# Patient Record
Sex: FEMALE | Race: WHITE
[De-identification: names, ages, dates, MRNs, and addresses within clinical notes are randomized per-mention and may not be internally consistent; named-entity substitution may affect disease eponyms.]

---

## 2017-02-02 ENCOUNTER — HOSPITAL ENCOUNTER (OUTPATIENT)
Dept: HOSPITAL 25 - ED | Age: 67
Setting detail: OBSERVATION
LOS: 1 days | Discharge: HOME | End: 2017-02-03
Attending: HOSPITALIST | Admitting: HOSPITALIST
Payer: MEDICARE

## 2017-02-02 DIAGNOSIS — Z88.1: ICD-10-CM

## 2017-02-02 DIAGNOSIS — F41.9: ICD-10-CM

## 2017-02-02 DIAGNOSIS — E03.9: ICD-10-CM

## 2017-02-02 DIAGNOSIS — G40.909: ICD-10-CM

## 2017-02-02 DIAGNOSIS — Z23: ICD-10-CM

## 2017-02-02 DIAGNOSIS — G40.509: Primary | ICD-10-CM

## 2017-02-02 LAB
ALBUMIN SERPL BCG-MCNC: 4 G/DL (ref 3.2–5.2)
ALP SERPL-CCNC: 156 U/L (ref 34–104)
ALT SERPL W P-5'-P-CCNC: 13 U/L (ref 7–52)
AMMONIA PLAS-SCNC: 30 MOL/L (ref 16–53)
ANION GAP SERPL CALC-SCNC: 9 MMOL/L (ref 2–11)
AST SERPL-CCNC: 15 U/L (ref 13–39)
BNP SERPL-MCNC: 20 PG/ML
BUN SERPL-MCNC: 10 MG/DL (ref 6–24)
BUN/CREAT SERPL: 15.4 (ref 8–20)
CALCIUM SERPL-MCNC: 9 MG/DL (ref 8.6–10.3)
CHLORIDE SERPL-SCNC: 93 MMOL/L (ref 101–111)
CK SERPL-CCNC: 53 U/L (ref 10–223)
GLOBULIN SER CALC-MCNC: 2.7 G/DL (ref 2–4)
GLUCOSE SERPL-MCNC: 117 MG/DL (ref 70–100)
HCO3 SERPL-SCNC: 25 MMOL/L (ref 22–32)
HCT VFR BLD AUTO: 44 % (ref 35–47)
HGB BLD-MCNC: 15.3 G/DL (ref 12–16)
LIPASE SERPL-CCNC: 13 U/L (ref 11–82)
MAGNESIUM SERPL-MCNC: 2 MG/DL (ref 1.9–2.7)
MCH RBC QN AUTO: 30 PG (ref 27–31)
MCHC RBC AUTO-ENTMCNC: 35 G/DL (ref 31–36)
MCV RBC AUTO: 87 FL (ref 80–97)
POTASSIUM SERPL-SCNC: 3.6 MMOL/L (ref 3.5–5)
PROT SERPL-MCNC: 6.7 G/DL (ref 6.4–8.9)
RBC # BLD AUTO: 5.09 10^6/UL (ref 4–5.4)
SODIUM SERPL-SCNC: 127 MMOL/L (ref 133–145)
TROPONIN I SERPL-MCNC: 0.02 NG/ML (ref ?–0.04)
TSH SERPL-ACNC: 1.04 MCIU/ML (ref 0.34–5.6)
WBC # BLD AUTO: 7.6 10^3/UL (ref 3.5–10.8)

## 2017-02-02 PROCEDURE — 82550 ASSAY OF CK (CPK): CPT

## 2017-02-02 PROCEDURE — 93005 ELECTROCARDIOGRAM TRACING: CPT

## 2017-02-02 PROCEDURE — 85025 COMPLETE CBC W/AUTO DIFF WBC: CPT

## 2017-02-02 PROCEDURE — 83880 ASSAY OF NATRIURETIC PEPTIDE: CPT

## 2017-02-02 PROCEDURE — G0378 HOSPITAL OBSERVATION PER HR: HCPCS

## 2017-02-02 PROCEDURE — 81003 URINALYSIS AUTO W/O SCOPE: CPT

## 2017-02-02 PROCEDURE — 80156 ASSAY CARBAMAZEPINE TOTAL: CPT

## 2017-02-02 PROCEDURE — 85730 THROMBOPLASTIN TIME PARTIAL: CPT

## 2017-02-02 PROCEDURE — 80048 BASIC METABOLIC PNL TOTAL CA: CPT

## 2017-02-02 PROCEDURE — 83690 ASSAY OF LIPASE: CPT

## 2017-02-02 PROCEDURE — 82140 ASSAY OF AMMONIA: CPT

## 2017-02-02 PROCEDURE — 84484 ASSAY OF TROPONIN QUANT: CPT

## 2017-02-02 PROCEDURE — 86140 C-REACTIVE PROTEIN: CPT

## 2017-02-02 PROCEDURE — 85610 PROTHROMBIN TIME: CPT

## 2017-02-02 PROCEDURE — 99406 BEHAV CHNG SMOKING 3-10 MIN: CPT

## 2017-02-02 PROCEDURE — 96374 THER/PROPH/DIAG INJ IV PUSH: CPT

## 2017-02-02 PROCEDURE — 84443 ASSAY THYROID STIM HORMONE: CPT

## 2017-02-02 PROCEDURE — 96375 TX/PRO/DX INJ NEW DRUG ADDON: CPT

## 2017-02-02 PROCEDURE — 80053 COMPREHEN METABOLIC PANEL: CPT

## 2017-02-02 PROCEDURE — 81015 MICROSCOPIC EXAM OF URINE: CPT

## 2017-02-02 PROCEDURE — 99283 EMERGENCY DEPT VISIT LOW MDM: CPT

## 2017-02-02 PROCEDURE — 82553 CREATINE MB FRACTION: CPT

## 2017-02-02 PROCEDURE — 83735 ASSAY OF MAGNESIUM: CPT

## 2017-02-02 PROCEDURE — 80299 QUANTITATIVE ASSAY DRUG: CPT

## 2017-02-02 PROCEDURE — 83605 ASSAY OF LACTIC ACID: CPT

## 2017-02-02 PROCEDURE — 36415 COLL VENOUS BLD VENIPUNCTURE: CPT

## 2017-02-02 PROCEDURE — 80177 DRUG SCRN QUAN LEVETIRACETAM: CPT

## 2017-02-02 RX ADMIN — CARBAMAZEPINE SCH MG: 200 TABLET, EXTENDED RELEASE ORAL at 21:55

## 2017-02-02 RX ADMIN — SODIUM CHLORIDE SCH MLS/HR: 900 IRRIGANT IRRIGATION at 08:57

## 2017-02-02 RX ADMIN — LACOSAMIDE SCH MG: 100 TABLET, FILM COATED ORAL at 21:55

## 2017-02-02 RX ADMIN — SODIUM CHLORIDE SCH MLS/HR: 900 IRRIGANT IRRIGATION at 17:49

## 2017-02-02 NOTE — HP
HISTORY AND PHYSICAL:

 

DATE OF ADMISSION:  02/02/17

 

PRIMARY CARE PROVIDER:  Scottie Morales MD

 

CONSULTING NEUROLOGISTS:

1.  Eyad Rodrigez MD

2.  Moni Bruner MD

 

CHIEF COMPLAINT:  Recurrent seizures.

 

HISTORY OF PRESENT ILLNESS:  Ms. Vega is a pleasant 66-year-old woman who came 
to the [facility] ED with several seizures lasting 20 to 30 seconds.  The patient 
continued to have a seizure in the ambulance and then another seizure in the 
emergency room. The patient has a history of seizures for many years.  She is 
under the care of Dr. Moni Bruner.  I believe there was a medication change 
with specific decreased in Vimpat to 150 mg by mouth once daily.  The patient 
is also on Tegretol and Keppra. I believe her Keppra dose is 2500 mg daily with 
2 doses daily; one of 1000 mg and the other one of 1500 mg.  Her Tegretol is 
200 mg by mouth 3 times daily.  Dr. Anthony Vallejo, the ED provider, spoke 
with Dr. Rodrigez over the telephone, who increased her Vimpat.  The patient did 
receive Ativan in the emergency room.  The patient was recommended for discharge
, but there was concern by the patient's brother, who was concerned that her 
independent living situation at [place] was insufficient with respect to 
monitoring.  The hospital service was contacted for observation and we agreed 
to observe her overnight with seizure precautions to ensure that she is stable 
for being discharged to an independent setting.

 

PAST MEDICAL HISTORY:

1.  Anxiety.

2.  Hypothyroidism.

 

OUTPATIENT MEDICATIONS:

1.  Multivitamin 1 tablet by mouth twice daily.

2.  Acetaminophen 650 mg by mouth every 4 hours as needed for pain/fevers.

3.  Tessalon Perles 100 mg by mouth 3 times daily as needed.

4.  Vimpat (lacosamide) 150 mg by mouth daily (A RECENT DECREASE)

5.  Levothyroxine 75 mcg daily.

6.  Loperamide 2 tabs twice daily as needed for loose stools.

7.  Zoloft (sertraline) 50 mg by mouth daily.

8.  Tegretol  mg by mouth 3 times daily.

9.  Keppra (levetiracetam) 1000 mg by mouth in the morning and 1500 mg by mouth 
in the evening.

 

ALLERGIES:  AMOXICILLIN, AMPICILLIN, and PHENACEMIDE.

 

FAMILY HISTORY:  Reviewed, but noncontributory based on the current 
presentation.

 

REVIEW OF SYSTEMS:  Fourteen-system review of systems was accomplished at the 
bedside without pertinent positives other than what is mentioned in the HPI, 
all other systems were negative.

 

                               PHYSICAL EXAMINATION

 

GENERAL APPEARANCE:  An elderly woman, appears stated age, in no apparent 
distress. She is slightly lethargic relative to her baseline.  This is as per 
the , although she has answered questions appropriately.

 

VITAL SIGNS:  On admission, temperature 99.2 Fahrenheit, pulse 97, respirations 
rate 16, oxygen saturation 100% on room air, and blood pressure 129/65.

 

HEENT:  Oropharynx is clear.  Mucous membranes are moist.

 

NECK:  Supple.  No bruits appreciated.  Midline trachea.  Normal thyroid.

 

CHEST:  Clear breath sounds anteriorly and posteriorly.

 

HEART:  Regular rate and rhythm.  No murmurs, rubs, or gallops.

 

ABDOMEN:  Soft and nontender.

 

NEURO:  No focal weakness or sensory loss.

 

PSYCH:  Normal affect.  No acute anxiety or depression noted in my conversion.

 

SKIN:  Dry and intact.

 

LYMPHS:  No adenopathy appreciated.

 

 ADMISSION DATA:  CBC was normal.  INR was unremarkable.  Blood chemistry is 
significant for hyponatremia with a sodium of 127, potassium 3.6, chloride 93, 
bicarb 25, BUN 10, creatinine 0.6, glucose 117, and lactic acid 3.7 (post 
seizure). Calcium and magnesium are normal as are the LFTs except for modestly 
elevated alk phos of 156, ammonia of 30, and total CK 53.  Troponin 0.02.  CRP 
elevated at 54. Urinalysis abnormal, but does not suggest the UTI with urine 
proteins 1+ by dip, urine rbc's 1+ by microscopy, and hyaline casts present as 
well as urine ascorbic acid present.  Toxicology reveals a carbamazepine level 
of 4.5.

 

IMPRESSION:  Ms. Vega is a 66-year-old woman who has longstanding seizures, 
had her seizure medication adjusted with the occurrence of more frequent 
seizures than is customary for her.  Her seizure medication regimen was 
adjusted by consultant, Dr. Eyad Rodrigez, but the patient is in a tenuous 
living situation and it would be proper to observe the patient for resolution 
of her seizures with this adjusted regimen before discharge.  Accordingly, the 
patient is being placed on observation status.  She will be kept on seizure 
precautions and we will continue all her medications as detailed above and she 
will be reevaluated in the morning with the intention of discharge at this 
point.

 

TIME SPENT:  Total time taken to admit Mr. Vega was 65 minutes, greater than 
half the time was spent going over the admission history and physical with the 
patient and discussing the plan of care with the patient's brother.

 

CC:  Dr. Morales; Dr. Rodrigez; Dr. Bruner * 

 

[id number] #: [id number]

MTDD

## 2017-02-02 NOTE — ED
Du RUSH Adam, scribed for Anthony Vallejo MD on 02/02/17 at 0847 .





Neurological HPI





- HPI Summary


HPI Summary: 


Patient is a 65 y/o female [facility] 20-

30seconds per EMS. Pt was having a sz in the exam room (lasting approximately 

15sec) at 0842. She has a Hx of seizures and is on Vimpat (per EMR). PMHx also 

includes anxiety and hypothyroidism. 








- History of Current Complaint


Chief Complaint: EDSeizure


Stated Complaint: SEIZURES


Time Seen by Provider: 02/02/17 08:34


Hx Obtained From: EMS


Hx From Patient Unobtainable Due To: Other - Seizure


Onset/Duration: Sudden Onset, Started hours ago, Still Present


Timing: Intermittent Episodes Lasting: - 15-30 seconds


Onset Severity: Moderate


Current Severity: Moderate


Seizure Severity: Moderate


Number of Seizures: 2


Neurological Deficit Location: Facial


Seizure Character: Partial (Specify) - Facial twitching


Aggravating: Unknown


Alleviating: Spontanious Resolution





- Allergy/Home Medications


Allergies/Adverse Reactions: 


 Allergies











Allergy/AdvReac Type Severity Reaction Status Date / Time


 


Amoxicillin Allergy Severe Swelling Verified 05/08/13 09:04





   Of  





   Face,Lips,&  





   Throat  


 


Ampicillin Allergy Severe Swelling Verified 05/08/13 09:04





   Of  





   Face,Lips,&  





   Throat  


 


Phenacemide [From Phenurone] Allergy Unknown Unknown Verified 05/08/13 09:05





   Reaction  





   Details  














PMH/Surg Hx/FS Hx/Imm Hx


Endocrine/Hematology History: Reports: Hx Thyroid Disease


   Denies: Hx Diabetes


Cardiovascular History: 


   Denies: Hx Hypertension


Respiratory History: 


   Denies: Hx Asthma, Hx Chronic Obstructive Pulmonary Disease (COPD)


GI History: 


   Denies: Hx Ulcer


Musculoskeletal History: 


   Denies: Hx Osteoporosis





- Surgical History


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


Infectious Disease History: 


   Denies: Hx Hepatitis, Hx Human Immunodeficiency Virus (HIV)





- Family History


Known Family History: Positive: Other - Breast CA





- Social History


Occupation: Retired


[facility]


Hx Substance Use: No


Substance Use Type: Reports: None





Review of Systems


Constitutional: Negative


Negative: Fever


Neurological: Other - Seizures


All Other Systems Reviewed And Are Negative: Yes





Physical Exam


Triage Information Reviewed: Yes


Vital Signs On Initial Exam: 


 Initial Vitals











Resp


 


 16 


 


 02/02/17 08:51











Vital Signs Reviewed: Yes


Appearance: Positive: Well-Appearing, No Pain Distress


Skin: Positive: Warm, Skin Color Reflects Adequate Perfusion, Dry


Head/Face: Positive: Normal Head/Face Inspection


Eyes: Positive: EOMI, MINDA


ENT: Positive: Normal ENT inspection


Neck: Positive: Supple, Nontender


Respiratory/Lung Sounds: Positive: Clear to Auscultation, Breath Sounds Present


Cardiovascular: Positive: RRR


Abdomen Description: Positive: Nontender, Soft


Bowel Sounds: Positive: Present


Musculoskeletal: Positive: Normal, Strength/ROM Intact - Moves all extremities


Neurological: Positive: Other - Seizure lasting 15 seconds: Facial twitching, 

eye and head deviation to the right. Post-ictal, confused.


Psychiatric: Positive: Affect/Mood Appropriate





Diagnostics





- Vital Signs


 Vital Signs











  Temp Pulse Resp BP Pulse Ox


 


 02/02/17 09:00  99.2 F  97  16  129/65  100


 


 02/02/17 08:51    16  














- Laboratory


Lab Results: 


 Lab Results











  02/02/17 02/02/17 02/02/17 Range/Units





  09:09 09:09 09:09 


 


WBC  7.6    (3.5-10.8)  10^3/ul


 


RBC  5.09    (4.0-5.4)  10^6/ul


 


Hgb  15.3    (12.0-16.0)  g/dl


 


Hct  44    (35-47)  %


 


MCV  87    (80-97)  fL


 


MCH  30    (27-31)  pg


 


MCHC  35    (31-36)  g/dl


 


RDW  14    (10.5-15)  %


 


Plt Count  213    (150-450)  10^3/ul


 


MPV  8    (7.4-10.4)  um3


 


Neut % (Auto)  91.4 H    (38-83)  %


 


Lymph % (Auto)  3.3 L    (25-47)  %


 


Mono % (Auto)  5.0    (1-9)  %


 


Eos % (Auto)  0.1    (0-6)  %


 


Baso % (Auto)  0.2    (0-2)  %


 


Absolute Neuts (auto)  7.0    (1.5-7.7)  10^3/ul


 


Absolute Lymphs (auto)  0.2 L    (1.0-4.8)  10^3/ul


 


Absolute Monos (auto)  0.4    (0-0.8)  10^3/ul


 


Absolute Eos (auto)  0    (0-0.6)  10^3/ul


 


Absolute Basos (auto)  0    (0-0.2)  10^3/ul


 


Absolute Nucleated RBC  0    10^3/ul


 


Nucleated RBC %  0    


 


INR (Anticoag Therapy)   0.93   (0.89-1.11)  


 


APTT   30.9   (26.0-36.3)  seconds


 


Sodium    127 L  (133-145)  mmol/L


 


Potassium    3.6  (3.5-5.0)  mmol/L


 


Chloride    93 L  (101-111)  mmol/L


 


Carbon Dioxide    25  (22-32)  mmol/L


 


Anion Gap    9  (2-11)  mmol/L


 


BUN    10  (6-24)  mg/dL


 


Creatinine    0.65  (0.51-0.95)  mg/dL


 


Est GFR ( Amer)    118.0  (>60)  


 


Est GFR (Non-Af Amer)    91.8  (>60)  


 


BUN/Creatinine Ratio    15.4  (8-20)  


 


Glucose    117 H  ()  mg/dL


 


Lactic Acid     (0.5-2.0)  mmol/L


 


Calcium    9.0  (8.6-10.3)  mg/dL


 


Magnesium    2.0  (1.9-2.7)  mg/dL


 


Total Bilirubin    0.40  (0.2-1.0)  mg/dL


 


AST    15  (13-39)  U/L


 


ALT    13  (7-52)  U/L


 


Alkaline Phosphatase    156 H  ()  U/L


 


Ammonia     (16-53)  mol/L


 


Total Creatine Kinase    53  ()  U/L


 


CK-MB (CK-2)    1.4  (0.6-6.3)  ng/mL


 


Troponin I    0.02  (<0.04)  ng/mL


 


C-Reactive Protein    54.08 H  (< 5.00)  mg/L


 


B-Natriuretic Peptide    ( - 100) pg/mL


 


Total Protein    6.7  (6.4-8.9)  g/dL


 


Albumin    4.0  (3.2-5.2)  g/dL


 


Globulin    2.7  (2-4)  g/dL


 


Albumin/Globulin Ratio    1.5  (1-3)  


 


Lipase    13  (11.0-82.0)  U/L


 


TSH    1.04  (0.34-5.60)  mcIU/mL


 


Urine Color     


 


Urine Appearance     


 


Urine pH     (5-9)  


 


Ur Specific Gravity     (1.010-1.030)  


 


Urine Protein     (Negative)  


 


Urine Ketones     (Negative)  


 


Urine Blood     (Negative)  


 


Urine Nitrate     (Negative)  


 


Urine Bilirubin     (Negative)  


 


Urine Urobilinogen     (Negative)  


 


Ur Leukocyte Esterase     (Negative)  


 


Urine WBC (Auto)     (Absent)  


 


Urine RBC (Auto)     (Absent)  


 


Ur Squamous Epith Cells     (Absent)  


 


Urine Bacteria     (Absent)  


 


Hyaline Casts     (Absent)  


 


Urine Glucose     (Negative)  


 


Urine Ascorbic Acid     (Negative)  


 


Carbamazepine    4.5  (4.0-12.0)  mcg/mL














  02/02/17 02/02/17 02/02/17 Range/Units





  09:09 09:09 11:54 


 


WBC     (3.5-10.8)  10^3/ul


 


RBC     (4.0-5.4)  10^6/ul


 


Hgb     (12.0-16.0)  g/dl


 


Hct     (35-47)  %


 


MCV     (80-97)  fL


 


MCH     (27-31)  pg


 


MCHC     (31-36)  g/dl


 


RDW     (10.5-15)  %


 


Plt Count     (150-450)  10^3/ul


 


MPV     (7.4-10.4)  um3


 


Neut % (Auto)     (38-83)  %


 


Lymph % (Auto)     (25-47)  %


 


Mono % (Auto)     (1-9)  %


 


Eos % (Auto)     (0-6)  %


 


Baso % (Auto)     (0-2)  %


 


Absolute Neuts (auto)     (1.5-7.7)  10^3/ul


 


Absolute Lymphs (auto)     (1.0-4.8)  10^3/ul


 


Absolute Monos (auto)     (0-0.8)  10^3/ul


 


Absolute Eos (auto)     (0-0.6)  10^3/ul


 


Absolute Basos (auto)     (0-0.2)  10^3/ul


 


Absolute Nucleated RBC     10^3/ul


 


Nucleated RBC %     


 


INR (Anticoag Therapy)     (0.89-1.11)  


 


APTT     (26.0-36.3)  seconds


 


Sodium     (133-145)  mmol/L


 


Potassium     (3.5-5.0)  mmol/L


 


Chloride     (101-111)  mmol/L


 


Carbon Dioxide     (22-32)  mmol/L


 


Anion Gap     (2-11)  mmol/L


 


BUN     (6-24)  mg/dL


 


Creatinine     (0.51-0.95)  mg/dL


 


Est GFR ( Amer)     (>60)  


 


Est GFR (Non-Af Amer)     (>60)  


 


BUN/Creatinine Ratio     (8-20)  


 


Glucose     ()  mg/dL


 


Lactic Acid   3.7 H*   (0.5-2.0)  mmol/L


 


Calcium     (8.6-10.3)  mg/dL


 


Magnesium     (1.9-2.7)  mg/dL


 


Total Bilirubin     (0.2-1.0)  mg/dL


 


AST     (13-39)  U/L


 


ALT     (7-52)  U/L


 


Alkaline Phosphatase     ()  U/L


 


Ammonia  30    (16-53)  mol/L


 


Total Creatine Kinase     ()  U/L


 


CK-MB (CK-2)     (0.6-6.3)  ng/mL


 


Troponin I     (<0.04)  ng/mL


 


C-Reactive Protein     (< 5.00)  mg/L


 


B-Natriuretic Peptide  20   ( - 100) pg/mL


 


Total Protein     (6.4-8.9)  g/dL


 


Albumin     (3.2-5.2)  g/dL


 


Globulin     (2-4)  g/dL


 


Albumin/Globulin Ratio     (1-3)  


 


Lipase     (11.0-82.0)  U/L


 


TSH     (0.34-5.60)  mcIU/mL


 


Urine Color    Yellow  


 


Urine Appearance    Cloudy  


 


Urine pH    6.0  (5-9)  


 


Ur Specific Gravity    1.020  (1.010-1.030)  


 


Urine Protein    1+(30 mg/dl) H  (Negative)  


 


Urine Ketones    Negative  (Negative)  


 


Urine Blood    Negative  (Negative)  


 


Urine Nitrate    Negative  (Negative)  


 


Urine Bilirubin    Negative  (Negative)  


 


Urine Urobilinogen    Negative  (Negative)  


 


Ur Leukocyte Esterase    Negative  (Negative)  


 


Urine WBC (Auto)    Trace(0-5/hpf)  (Absent)  


 


Urine RBC (Auto)    1+(3-5/hpf) H  (Absent)  


 


Ur Squamous Epith Cells    Present H  (Absent)  


 


Urine Bacteria    Absent  (Absent)  


 


Hyaline Casts    Present H  (Absent)  


 


Urine Glucose    Negative  (Negative)  


 


Urine Ascorbic Acid    * H  (Negative)  


 


Carbamazepine     (4.0-12.0)  mcg/mL











Result Diagrams: 


 02/02/17 09:09





 02/02/17 09:09


Lab Statement: Any lab studies that have been ordered have been reviewed, and 

results considered in the medical decision making process.





- EKG


  ** 08:32


Cardiac Rate: NL - 95 BPM


ST Segment: Normal


Ectopy: None





- Additional Comments


Diagnostic Additional Comments: 


Troponin I - 0.02





Lactic Acid - 3.7








Course/Dx





- Course


Assessment/Plan: patient had one seizure at dinner, 2/1/17.  Today she had a 

seizure when medications were being dispensed (she did not get her am meds), 

another seizure in the ambulance and a third in the ED.  In the ED, She got 

ativan 1mg iv,vimpat 150mg po, keppra 1000mg po and carbamazepine 200mg po.  

After discussion with Dr Rodrigez, the vimpat will be increased to 100mg po bid 

to be started tomorrow am, 2/3/17.  She received another dose of 50mg of po 

vimpat in the ED.  This was all discussed with the patient and her son.  

Discharge home stable.





- Diagnoses


Provider Diagnoses: 


 Seizure








Discharge





- Discharge Plan


Condition: Stable


Disposition: HOME


Prescriptions: 


Lacosamide TAB* [Vimpat TAB*] 100 mg PO BID #60 tab 


Patient Education Materials:  Epilepsy (ED)


Referrals: 


Scottie Morales MD [Primary Care Provider] - 


Additional Instructions: 


INCREASE YOUR DOSE OF VIMPAT TO 100MG TWICE A DAY.


FOLLOW UP WITH YOUR NEUROLOGIST.


RETURN TO THE EMERGENCY DEPARTMENT FOR ANY WORSENING OF YOUR CONDITION OR 

QUESTIONS OR CONCERNS.





The documentation as recorded by the Du moore Adam accurately reflects 

the service I personally performed and the decisions made by me, Anthony Vallejo MD.

## 2017-02-02 NOTE — ED
Progress





- Progress Note


Progress Note: 


AFTER DISCUSSION WITH SON, THERE IS A CONCERN OF PATIENT'S SAFETY AT HER 

INDEPENDENT LIVING.


ADMIT HOSPITALIST STABLE.





Course/Dx





- Diagnoses


Provider Diagnoses: 


 Seizure

## 2017-02-03 VITALS — DIASTOLIC BLOOD PRESSURE: 63 MMHG | SYSTOLIC BLOOD PRESSURE: 120 MMHG

## 2017-02-03 LAB
ANION GAP SERPL CALC-SCNC: 7 MMOL/L (ref 2–11)
BUN SERPL-MCNC: 6 MG/DL (ref 6–24)
BUN/CREAT SERPL: 13.3 (ref 8–20)
CALCIUM SERPL-MCNC: 8 MG/DL (ref 8.6–10.3)
CHLORIDE SERPL-SCNC: 101 MMOL/L (ref 101–111)
GLUCOSE SERPL-MCNC: 95 MG/DL (ref 70–100)
HCO3 SERPL-SCNC: 22 MMOL/L (ref 22–32)
HCT VFR BLD AUTO: 39 % (ref 35–47)
HGB BLD-MCNC: 13.3 G/DL (ref 12–16)
MCH RBC QN AUTO: 30 PG (ref 27–31)
MCHC RBC AUTO-ENTMCNC: 34 G/DL (ref 31–36)
MCV RBC AUTO: 88 FL (ref 80–97)
POTASSIUM SERPL-SCNC: 3.6 MMOL/L (ref 3.5–5)
RBC # BLD AUTO: 4.47 10^6/UL (ref 4–5.4)
SODIUM SERPL-SCNC: 130 MMOL/L (ref 133–145)
WBC # BLD AUTO: 4.7 10^3/UL (ref 3.5–10.8)

## 2017-02-03 RX ADMIN — CARBAMAZEPINE SCH MG: 200 TABLET, EXTENDED RELEASE ORAL at 09:44

## 2017-02-03 RX ADMIN — LACOSAMIDE SCH MG: 100 TABLET, FILM COATED ORAL at 09:45

## 2017-02-03 RX ADMIN — SODIUM CHLORIDE SCH MLS/HR: 900 IRRIGANT IRRIGATION at 04:26

## 2017-02-04 NOTE — DS
DISCHARGE SUMMARY:

 

DATE OF ADMISSION:  02/02/17

 

DATE OF DISCHARGE:  02/03/17

 

PRIMARY CARE:  Scottie Morales MD.

 

STATUS DURING HOSPITALIZATION:  Observation.

 

DISCHARGE DIAGNOSIS:  Recurrent seizure secondary to medication regimen 
adjustment with restoration of effective regimen and no seizures overnight.

 

DISCHARGE MEDICATIONS:

1.  Multivitamin 1 tablet by mouth twice daily.

2.  Acetaminophen 650 mg by mouth every 4 hours as needed for pain/fevers.

3.  Tessalon Perles 100 mg by mouth 3 times daily as needed.

4.  Vimpat (lacosamide) 200 mg by mouth daily (100 b.i.d.)

5.  Levothyroxine 75 mcg daily.

6.  Loperamide 2 tabs twice daily as needed for loose stools.

7.  Zoloft/sertraline 50 mg by mouth daily.

8.  Tegretol  mg by mouth 3 times daily.

9.  Keppra (levetiracetam) 1000 mg by mouth in the morning and 1500 mg by mouth 
in the evening.

 

HISTORY OF PRESENT ILLNESS AND HOSPITAL COURSE:  Please see my H and P 
describing how the patient is a 66-year-old woman with known epilepsy who had 
an increasing frequency of seizures in response to a decrease (this is the 
thought) in her Vimpat.  The patient was advised by neurology to increase her 
Vimpat, but there was concerned about the patient's stability and so she was 
placed on observation status overnight to ensure she was not having continued 
frequent seizures as she did on presentation.  The patient indeed had no 
seizures overnight and was stable for discharge in the morning on the discharge 
regimen above.  This was communicated with the patient's brother and she is 
being discharged back to [place].

 

TIME SPENT:  Total time taken to discharge Ms. Vega was 30 minutes, greater 
than half the time was spent going over the discharge instructions.  The 
patient was given return to ED instructions, but if she has frequent seizure 
reoccurrences she should contact her neurologist or come directly back to the 
emergency room.

 

CONDITION AT DISCHARGE:  Stable.

 

She is to follow up next week with Dr. Bruner or associate at [facility] and also with Dr. Morales as per her usual 
schedule.



CC:  Dr. Morales *

 

 [id number]/CPS #: [id number]

[facility]

## 2017-11-26 ENCOUNTER — HOSPITAL ENCOUNTER (EMERGENCY)
Dept: HOSPITAL 25 - ED | Age: 67
Discharge: HOME | End: 2017-11-26
Payer: MEDICARE

## 2017-11-26 VITALS — SYSTOLIC BLOOD PRESSURE: 137 MMHG | DIASTOLIC BLOOD PRESSURE: 59 MMHG
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
## 2017-11-26 DIAGNOSIS — S01.91XA: Primary | ICD-10-CM
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
## 2017-11-26 DIAGNOSIS — F17.210: ICD-10-CM

## 2017-11-26 PROCEDURE — 70486 CT MAXILLOFACIAL W/O DYE: CPT

## 2017-11-26 PROCEDURE — 90715 TDAP VACCINE 7 YRS/> IM: CPT

## 2017-11-26 PROCEDURE — 70450 CT HEAD/BRAIN W/O DYE: CPT

## 2017-11-26 PROCEDURE — 12002 RPR S/N/AX/GEN/TRNK2.6-7.5CM: CPT

## 2017-11-26 PROCEDURE — 99282 EMERGENCY DEPT VISIT SF MDM: CPT

## 2017-11-26 PROCEDURE — 90471 IMMUNIZATION ADMIN: CPT

## 2017-11-26 NOTE — RAD
INDICATION:  Head injury.



COMPARISON:  Comparison is made with a prior CT of the brain from February 27, 2017.



TECHNIQUE: Contiguous axial sections of the brain were obtained from the skull base to the

vertex without contrast.



FINDINGS: The ventricles, cisterns and sulci are enlarged consistent with diffuse atrophy.

 There is a focal area of encephalomalacia present in the left frontal and temporal lobes

which is unchanged from the prior study. No other focal abnormality is or mass effect are

seen. There is no evidence for hemorrhage.



The patient is status post left frontal craniotomy.



There is focal soft tissue swelling and air within the soft tissues anterior to the right

frontal bone. There appears to be a small hematoma present measuring 4.0 x 0.6 cm in size.

No fracture is seen.



The visualized portion of the paranasal sinuses and mastoid air cells appear clear.



IMPRESSION:  

1. NO EVIDENCE FOR ACUTE INTRACRANIAL ABNORMALITY.

2. SMALL HEMATOMA ANTERIOR TO THE RIGHT FRONTAL BONE. NO FRACTURE IS SEEN. THERE IS AIR

PRESENT SUGGESTIVE OF A LACERATION TYPE INJURY.

3. POSTSURGICAL CHANGES AND LEFT FRONTAL AND TEMPORAL ENCEPHALOMALACIA, UNCHANGED.

## 2017-11-26 NOTE — RAD
INDICATION:  Facial trauma.



COMPARISON:  There are no prior studies available for comparison.



TECHNIQUE: Contiguous axial sections of the axial images of the facial bones were obtained

and reconstructed in the coronal and sagittal planes.



FINDINGS: Soft tissue swelling is noted anterior to the right frontal bone and orbit.

There is a small hematoma present anterior to the frontal bone measuring 4.6 x 0.7 cm in

size.



The walls of the orbits and maxillary sinuses appear intact.  The zygomatic arches appear

intact.  There is no evidence for a fracture of the mandible.



The nasal bones appear intact. There is mild to moderate deviation of the nasal septum

convex toward the left along its superior portion and toward the right along its inferior

portion. The pterygoid plates appear intact.



The paranasal sinuses appear clear.



IMPRESSION:  THERE IS SOFT TISSUE SWELLING ANTERIOR TO THE RIGHT ORBIT AND FRONTAL BONE.

THERE IS A SMALL HEMATOMA ANTERIOR TO THE FRONTAL BONE. NO FRACTURE IS SEEN.

## 2017-12-01 NOTE — ED
Wilman RUSH Alfonso, scribed for Julian Campa MD on 11/26/17 at 1449 .





Adult Trauma





- HPI Summary


HPI Summary: 


 This patient is a 67 year old F BIBA to [facility] s/p a mechanical fall earlier 

today while on the sidewalk about to smoke a cigarette. The patient rates the 

pain 8/10 in severity. Symptoms aggravated by nothing. Patient reports head 

trauma (wound bandaged by EMS). Patient denies LOC, and pain including neck pain

, LE pain, hip pain, abdominal pain, CP, wrist pain, and shoulder pain.





- History of Current Complaint


Chief Complaint: EDHeadInjury


Stated Complaint: FALL HEAD LAC


Time Seen by Provider: 11/26/17 14:39


Hx Obtained From: Patient


Mechanism of Injury: Fall


Loss of Consciousness: no loss of consciousness


Onset/Duration: Traumatic


Onset of Pain: Post Accident


Current Severity: Moderate


Pain Intensity: 8


Pain Scale Used: 0-10 Numeric


Location: Head


Aggravating Factor(s): Nothing


Associated Signs & Symptoms: Positive: Other: - head trauma (wound bandaged by 

EMS). Patient denies LOC, and pain including neck pain, LE pain, hip pain, 

abdominal pain, CP, wrist pain, and shoulder pain.





- Additional Pertinent History


Primary Care Physician: [id number]





- Allergy/Home Medications


Allergies/Adverse Reactions: 


 Allergies











Allergy/AdvReac Type Severity Reaction Status Date / Time


 


Amoxicillin Allergy Severe Swelling Verified 05/08/13 09:04





   Of  





   Face,Lips,&  





   Throat  


 


Ampicillin Allergy Severe Swelling Verified 05/08/13 09:04





   Of  





   Face,Lips,&  





   Throat  


 


Phenacemide [From Phenurone] Allergy Unknown Unknown Verified 05/08/13 09:05





   Reaction  





   Details  














PMH/Surg Hx/FS Hx/Imm Hx


Endocrine/Hematology History: Reports: Hx Thyroid Disease


   Denies: Hx Diabetes


Cardiovascular History: 


   Denies: Hx Hypertension


Respiratory History: 


   Denies: Hx Asthma, Hx Chronic Obstructive Pulmonary Disease (COPD)


GI History: 


   Denies: Hx Ulcer


Musculoskeletal History: 


   Denies: Hx Osteoporosis


Sensory History: Reports: Hx Contacts or Glasses


Opthamlomology History: Reports: Hx Contacts or Glasses


Neurological History: Reports: Hx Seizures





- Surgical History


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


Infectious Disease History: No


Infectious Disease History: 


   Denies: Hx Hepatitis, Hx Human Immunodeficiency Virus (HIV), Traveled 

Outside the [place] in Last 30 Days





- Family History


Known Family History: Positive: Other - Breast CA





- Social History


Alcohol Use: None


Hx Substance Use: No


Substance Use Type: Reports: None


Hx Tobacco Use: Yes


Smoking Status (MU): Light Every Day Tobacco Smoker


Type: Cigarettes


Have You Smoked in the Last Year: Yes





Review of Systems


Negative: Fever


Positive: Other - fall; negative 


Neurological: Other - head trauma (wound bandaged by EMS); negative LOC


All Other Systems Reviewed And Are Negative: Yes





Physical Exam





- Summary


Physical Exam Summary: 





VITAL SIGNS: Reviewed.


GENERAL:  Patient is a well-developed and nourished female who is lying 

comfortable in the stretcher.  Patient is not in any acute respiratory distress.


HEAD AND FACE: No skull depressions. No sinus tenderness. Swelling above the 

right eyebrow. Contused tissue. 3 cm linear laceration at right forehead. 3 mm 

linear laceration at right forehead.


EYES: PERRLA, EOMI x 2, No injected conjunctiva, no nystagmus.


EARS: Hearing grossly intact. Ear canals and tympanic membranes are within 

normal limits.


MOUTH: Oropharynx within normal limits.


NECK: Supple, trachea is midline, no adenopathy, no JVD, no carotid bruit, no c-

spine tenderness, neck with full ROM.


CHEST: Symmetric, no tenderness at palpation


LUNGS: Clear to auscultation bilaterally. No wheezing or crackles.


CVS: Regular rate and rhythm, S1 and S2 present, no murmurs or gallops 

appreciated.


ABDOMEN: Soft, non-tender. No signs of distention. No rebound no guarding, and 

no masses palpated. Bowel sounds are normal.


EXTREMITIES: FROM in all major joints, no edema, no cyanosis or clubbing.


NEURO: Alert and oriented x 3. No acute neurological deficits. Speech is normal 

and follows commands.


SKIN: Dry and warm





Triage Information Reviewed: Yes


Vital Signs On Initial Exam: 


 Initial Vitals











Temp Pulse Resp BP Pulse Ox


 


 98.1 F   71   18   140/65   99 


 


 11/26/17 14:31  11/26/17 14:31  11/26/17 14:31  11/26/17 14:31  11/26/17 14:31











Vital Signs Reviewed: Yes





- Elodia Coma Scale


Best Eye Response: 4 - Spontaneous


Best Motor Response: 6 - Obeys Commands


Best Verbal Response: 5 - Oriented





Procedures





- Laceration/Wound Repair


  ** 1


Location: head - Right forehead


Description: Linear


Anesthesia: Local, 1.0%, Lido


Length, Depth and Shape: 3 cm


Laceration/Wound Explored: clean


Closure: Single Layer


Suture Type: Other - 4-0 Polypropylene


Number of Sutures: 7





  ** 2


Location: head - Right forehead


Description: Linear


Anesthesia: Local, 1.0%, Lido


Length, Depth and Shape: 3 mm


Laceration/Wound Explored: clean


Closure: Single Layer


Suture Type: Other - 4-0 Polypropylene


Number of Sutures: 1





Diagnostics





- Vital Signs


 Vital Signs











  Temp Pulse Resp BP Pulse Ox


 


 11/26/17 14:31  98.1 F  71  18  140/65  99














- Laboratory


Lab Statement: Any lab studies that have been ordered have been reviewed, and 

results considered in the medical decision making process.





- CT


  ** Brain


CT Interpretation Completed By: Radiologist - 1. NO EVIDENCE FOR ACUTE 

INTRACRANIAL ABNORMALITY. 2. SMALL HEMATOMA ANTERIOR TO THE RIGHT FRONTAL BONE. 

NO FRACTURE IS SEEN. THERE IS AIR PRESENT SUGGESTIVE OF A LACERATION TYPE 

INJURY. 3. POSTSURGICAL CHANGES AND LEFT FRONTAL AND TEMPORAL ENCEPHALOMALACIA, 

UNCHANGED. ED physician has reviewed this radiology report and agrees.





  ** Maxillofacial


CT Interpretation Completed By: Radiologist - THERE IS SOFT TISSUE SWELLING 

ANTERIOR TO THE RIGHT ORBIT AND FRONTAL BONE. THERE IS A SMALL HEMATOMA 

ANTERIOR TO THE FRONTAL BONE. NO FRACTURE IS SEEN. ED physician has reviewed 

this radiology report and agrees.





Adult Trauma Course/Dx





- Course


Assessment/Plan: This patient is [facility] 67 [facility] 

[facility] earlier today while on the sidewalk about to smoke a cigarette. The 

patient rates the pain 8/10 in severity. Symptoms aggravated by nothing. 

Patient reports head trauma (wound bandaged by EMS). Patient denies LOC, and 

pain including neck pain, LE pain, hip pain, abdominal pain, CP, wrist pain, 

and shoulder pain. CT Brain reveals, per radiologist, 1. NO EVIDENCE FOR ACUTE 

INTRACRANIAL ABNORMALITY. 2. SMALL HEMATOMA ANTERIOR TO THE RIGHT FRONTAL BONE. 

NO FRACTURE IS SEEN. THERE IS AIR PRESENT SUGGESTIVE OF A LACERATION TYPE 

INJURY. 3. POSTSURGICAL CHANGES AND LEFT FRONTAL AND TEMPORAL ENCEPHALOMALACIA, 

UNCHANGED. ED physician has reviewed this radiology report and agrees. CT 

Maxillofacial reveals, per radiologist, THERE IS SOFT TISSUE SWELLING ANTERIOR 

TO THE RIGHT ORBIT AND FRONTAL BONE. THERE IS A SMALL HEMATOMA ANTERIOR TO THE 

FRONTAL BONE. NO FRACTURE IS SEEN. ED physician has reviewed this radiology 

report and agrees. Lacerations repaired as described in the procedure note. 

Patient will be discharged with follow up from PCP. The patient is agreeable 

with this plan. The patient is hemodynamically stable, alert and oriented x3.





- Diagnoses


Provider Diagnoses: 


 Head contusion, Laceration, Accidental fall








Discharge





- Discharge Plan


Condition: Stable


Disposition: HOME


Patient Education Materials:  Care For Your Stitches (ED), Contusion in Adults (

ED), Fall Prevention (ED), Facial Laceration (ED)


Referrals: 


Scottie Morales MD [Primary Care Provider] - 3 Days


Additional Instructions: 


RETURN TO THE EMERGENCY DEPARTMENT OR [facility] IN 10 DAYS TO HAVE 

YOUR SUTURES REMOVED.


RETURN TO THE EMERGENCY DEPARTMENT FOR CHANGING OR WORSENING SYMPTOMS.





The documentation as recorded by the Wilman moore Alfonso accurately reflects 

the service I personally performed and the decisions made by Lokesh nazario Walter, MD.

## 2017-12-06 ENCOUNTER — HOSPITAL ENCOUNTER (EMERGENCY)
Dept: HOSPITAL 25 - ED | Age: 67
Discharge: HOME | End: 2017-12-06
Payer: MEDICARE

## 2017-12-06 VITALS — DIASTOLIC BLOOD PRESSURE: 61 MMHG | SYSTOLIC BLOOD PRESSURE: 136 MMHG
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
## 2017-12-06 DIAGNOSIS — S02.2XXA: Primary | ICD-10-CM
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
## 2017-12-06 DIAGNOSIS — F17.210: ICD-10-CM

## 2017-12-06 PROCEDURE — 99282 EMERGENCY DEPT VISIT SF MDM: CPT

## 2017-12-06 PROCEDURE — 70450 CT HEAD/BRAIN W/O DYE: CPT

## 2017-12-06 PROCEDURE — 70486 CT MAXILLOFACIAL W/O DYE: CPT

## 2017-12-06 NOTE — ED
Throat Pain/Nasal Congestion





- HPI Summary


HPI Summary: 





Patient presents to the ED with CC of facial trauma after tripping and falling 

prior to arrival.  Obvious signs of trauma to the face.  Most notably right 

maxilla swelling and nasal bone deformity.  She denies pain.  She denies visual 

changes or other pain.  Ecchymosois to the right maxilla and infraorbitally.  

Denies difficulty breathing.  States she has had a minimal amount of blood 

loss.  She lives at an independent living facility.  History of brain surgery 

and seizures.  She states this was not a seizure and recalls everything before 

and after the incident.  Denies any other complaints.  





- History of Current Complaint


Chief Complaint: EDFacialInjury


Time Seen by Provider: 12/06/17 13:43


Hx Obtained From: Patient


Onset/Duration: Sudden Onset


Severity: Moderate


Associated Signs And Symptoms: Positive: Negative





- Epiglottits Risk Factors


Epiglottis Risk Factors: Negative





- Allergies/Home Medications


Allergies/Adverse Reactions: 


 Allergies











Allergy/AdvReac Type Severity Reaction Status Date / Time


 


Amoxicillin Allergy Severe Swelling Verified 05/08/13 09:04





   Of  





   Face,Lips,&  





   Throat  


 


Ampicillin Allergy Severe Swelling Verified 05/08/13 09:04





   Of  





   Face,Lips,&  





   Throat  


 


Phenacemide [From Phenurone] Allergy Unknown Unknown Verified 05/08/13 09:05





   Reaction  





   Details  














PMH/Surg Hx/FS Hx/Imm Hx


Previously Healthy: Yes


Endocrine/Hematology History: Reports: Hx Thyroid Disease


   Denies: Hx Anticoagulant Therapy, Hx Diabetes


Cardiovascular History: 


   Denies: Hx Hypertension


Respiratory History: 


   Denies: Hx Asthma, Hx Chronic Obstructive Pulmonary Disease (COPD)


GI History: 


   Denies: Hx Ulcer


Musculoskeletal History: 


   Denies: Hx Osteoporosis


Sensory History: Reports: Hx Contacts or Glasses


Opthamlomology History: Reports: Hx Contacts or Glasses


Neurological History: Reports: Hx Seizures





- Surgical History


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





- Immunization History


Date of Tetanus Vaccine: UTD


Date of Influenza Vaccine: UTD


Hx Pertussis Vaccination: No


Immunizations Up to Date: Unable to Obtain/Confirm


Infectious Disease History: No


Infectious Disease History: 


   Denies: Hx Hepatitis, Hx Human Immunodeficiency Virus (HIV), Traveled 

Outside the [place] in Last 30 Days





- Family History


Known Family History: Positive: Other - Breast CA





- Social History


Occupation: Unemployed, Disabled


Lives: Assisted Living


Alcohol Use: None


Hx Substance Use: No


Substance Use Type: Reports: None


Hx Tobacco Use: Yes


Smoking Status (MU): Light Every Day Tobacco Smoker


Type: Cigarettes


Have You Smoked in the Last Year: Yes





Review of Systems


Constitutional: Negative


Negative: Fever, Chills, Fatigue


Eyes: Negative


Positive: Epistaxis


Cardiovascular: Negative


Respiratory: Negative


Genitourinary: Negative


Positive: no symptoms reported, see HPI


Musculoskeletal: Negative


Positive: Other - ecchymosos infraorbitally over the right eye


Neurological: Negative


Psychological: Normal


All Other Systems Reviewed And Are Negative: Yes





Physical Exam


Triage Information Reviewed: Yes


Vital Signs On Initial Exam: 


 Initial Vitals











Temp Pulse Resp BP Pulse Ox


 


 96.9 F   86   15   136/61   96 


 


 12/06/17 13:36  12/06/17 13:36  12/06/17 13:36  12/06/17 13:36  12/06/17 13:36











Vital Signs Reviewed: Yes


Appearance: Positive: Well-Appearing, Well-Nourished


Skin: Positive: Warm, Skin Color Reflects Adequate Perfusion, Other - 

ecchymosos infraorbitally over the right eye


Head/Face: Positive: Other - obvious facial trauma - ecchymosos infraorbitally 

over the right eye.  swelling to the right maxilla


Eyes: Positive: EOMI, MINDA, Conjunctiva Clear


ENT: Positive: Sinus tenderness, Uvula midline.  Negative: Dental tenderness


Neck: Positive: Supple, Nontender, No Lymphadenopathy


Respiratory/Lung Sounds: Positive: Clear to Auscultation, Breath Sounds Present


Cardiovascular: Positive: Normal, RRR, Pulses are Symmetrical in both Upper and 

Lower Extremities


Musculoskeletal: Positive: Strength/ROM Intact


Neurological: Positive: Normal, Sensory/Motor Intact, Alert, Oriented to Person 

Place, Time, Speech Normal


Psychiatric: Positive: Affect/Mood Appropriate





- Elodia Coma Scale


Best Eye Response: 4 - Spontaneous


Best Motor Response: 6 - Obeys Commands


Best Verbal Response: 5 - Oriented


Coma Scale Total: 15





Diagnostics





- Vital Signs


 Vital Signs











  Temp Pulse Resp BP Pulse Ox


 


 12/06/17 13:36  96.9 F  86  15  136/61  96














- Laboratory


Lab Statement: Any lab studies that have been ordered have been reviewed, and 

results considered in the medical decision making process.





EENT Course/Dx





- Course


Course Of Treatment: No obvious perforation with teardrop pupil, vitreous 

extrusion, or protruding intraocular foreign body.  No orbital compartment 

syndrome, hyphema, subconjunctival hemorrhage, evidence of increased 

intraorbital pressure.  No septal hematoma visualized on exam.  Pain on 

palpation to the nasal bone and the right maxilla. Denies pain to the left 

maxilla.  Denies dental pain.  No lesions or open areas noted inside the mouth.

  Gait normal.  Coordination and strength tested in upper and lower extremities 

WNL.  Pronator drift not present.  No hemotympanum.  Given the PE findings, 

will refer her to ENT for evaluation.  No signs on PE warranting immediate 

referral. She is given Doxycycline d/t a cillin allergy. She is OK for 

discharge and all paperwork from independent living is filled out for patient.





- Differential Diagnoses


Differential Diagnoses: Contusion, Fracture





- Diagnoses


Provider Diagnoses: 


 Nasal fracture








Discharge





- Discharge Plan


Condition: Stable


Disposition: HOME


Prescriptions: 


DOXYcycline CAP(*) [DOXYcycline 100MG CAP(*)] 100 mg PO BID #10 cap


Patient Education Materials:  Facial Fracture (ED)


Referrals: 


Scottie Morales MD [Primary Care Provider] - 


Corey Wade MD [Medical Doctor] - 


Additional Instructions: 


Please follow up with ENT.  


Call today or tomorrow for appt


Augmentin prescribed to you.


First dose given in ED.


Take second dose this evening before bed.


Begin tomorrow twice per day. 





Images





- Images


Head: 


  __________________________














  __________________________





 1 - swelling with ecchmosis

## 2017-12-06 NOTE — RAD
HISTORY: Trauma, history of brain surgery



COMPARISONS: November 26, 2017



TECHNIQUE: Multiple contiguous axial CT scans were obtained of the head without 

intravenous contrast. 



FINDINGS: 

HEMORRHAGE/INFARCT: There is no hemorrhage or acute infarct.

MASSES/SHIFT: There is no mass or shift.



EXTRA-AXIAL SPACES: There are no extra-axial fluid collections.

SULCI AND VENTRICLES: The sulci and ventricles are normal in size and position for the

patient's stated age.



CEREBRUM: There is encephalomalacia of the left inferior frontal lobe, temporal lobe, and

inferior parietal lobe, stable.

BRAINSTEM: There are no focal parenchymal abnormalities.

CEREBELLUM: There are no focal parenchymal abnormalities.



VESSELS: The vessels are grossly normal.

PARANASAL SINUSES: The paranasal sinuses are clear.

ORBITS: The orbits are unremarkable.

BONES AND SOFT TISSUE: There is post surgical change to the skull. There is expansion of

the diploic space which can be seen in the setting of chronic anticonvulsant therapy



OTHER: None



IMPRESSION: 

STABLE ENCEPHALOMALACIA WITH POSTSURGICAL CHANGE TO THE SKULL. NO ACUTE INTRACRANIAL

PATHOLOGY.

## 2017-12-06 NOTE — RAD
INDICATION: Fall landing on face. Bloody nose. Bruising under bilateral eyes.



COMPARISON: November 26, 2017 CT.



TECHNIQUE: Multidetector CT base of the skull through mandible without contrast.

Multiplanar reformation.



REPORT: Artifact from dental amalgam. Soft tissue swelling and subcutaneous emphysema over

the bridge of the nose. Mild subcutaneous edema over the malar eminences. No loculated

soft tissue plane hematoma evident.



Mildly comminuted and posteriorly impacted nasal bone fractures new compared with the

recent prior exam.



The orbital and maxillary sinus margins, zygomatic arches, lamina papyracea,  base of the

maxilla, and pterygoid plates are intact.  The mandible is intact.  Normal temporal

mandibular joint alignment. 



Unremarkable orbital contents.



IMPRESSION: Mildly comminuted and posteriorly impacted nasal bone fractures new compared

with the recent prior exam. Overlying soft tissue swelling and subcutaneous emphysema.

## 2018-02-02 ENCOUNTER — HOSPITAL ENCOUNTER (OUTPATIENT)
Dept: HOSPITAL 25 - ED | Age: 68
Setting detail: OBSERVATION
LOS: 1 days | Discharge: ANOTHER HOSPITAL | End: 2018-02-03
Attending: INTERNAL MEDICINE | Admitting: HOSPITALIST
Payer: MEDICARE

## 2018-02-02 DIAGNOSIS — G40.909: ICD-10-CM

## 2018-02-02 DIAGNOSIS — J44.1: Primary | ICD-10-CM

## 2018-02-02 DIAGNOSIS — R53.83: ICD-10-CM

## 2018-02-02 DIAGNOSIS — J40: ICD-10-CM

## 2018-02-02 DIAGNOSIS — R50.9: ICD-10-CM

## 2018-02-02 DIAGNOSIS — F03.90: ICD-10-CM

## 2018-02-02 DIAGNOSIS — F17.210: ICD-10-CM

## 2018-02-02 DIAGNOSIS — R06.02: ICD-10-CM

## 2018-02-02 LAB
BASOPHILS # BLD AUTO: 0 10^3/UL (ref 0–0.2)
EOSINOPHIL # BLD AUTO: 0 10^3/UL (ref 0–0.6)
HCT VFR BLD AUTO: 41 % (ref 35–47)
HGB BLD-MCNC: 14 G/DL (ref 12–16)
LYMPHOCYTES # BLD AUTO: 0.7 10^3/UL (ref 1–4.8)
MCH RBC QN AUTO: 30 PG (ref 27–31)
MCHC RBC AUTO-ENTMCNC: 35 G/DL (ref 31–36)
MCV RBC AUTO: 88 FL (ref 80–97)
MONOCYTES # BLD AUTO: 0.4 10^3/UL (ref 0–0.8)
NEUTROPHILS # BLD AUTO: 3.2 10^3/UL (ref 1.5–7.7)
NRBC # BLD AUTO: 0 10^3/UL
NRBC BLD QL AUTO: 0
PLATELET # BLD AUTO: 183 10^3/UL (ref 150–450)
RBC # BLD AUTO: 4.64 10^6/UL (ref 4–5.4)
WBC # BLD AUTO: 4.4 10^3/UL (ref 3.5–10.8)

## 2018-02-02 PROCEDURE — 83605 ASSAY OF LACTIC ACID: CPT

## 2018-02-02 PROCEDURE — 85027 COMPLETE CBC AUTOMATED: CPT

## 2018-02-02 PROCEDURE — 80048 BASIC METABOLIC PNL TOTAL CA: CPT

## 2018-02-02 PROCEDURE — 83880 ASSAY OF NATRIURETIC PEPTIDE: CPT

## 2018-02-02 PROCEDURE — 83735 ASSAY OF MAGNESIUM: CPT

## 2018-02-02 PROCEDURE — 87040 BLOOD CULTURE FOR BACTERIA: CPT

## 2018-02-02 PROCEDURE — 94760 N-INVAS EAR/PLS OXIMETRY 1: CPT

## 2018-02-02 PROCEDURE — 87641 MR-STAPH DNA AMP PROBE: CPT

## 2018-02-02 PROCEDURE — 87502 INFLUENZA DNA AMP PROBE: CPT

## 2018-02-02 PROCEDURE — 85025 COMPLETE CBC W/AUTO DIFF WBC: CPT

## 2018-02-02 PROCEDURE — 80177 DRUG SCRN QUAN LEVETIRACETAM: CPT

## 2018-02-02 PROCEDURE — 71045 X-RAY EXAM CHEST 1 VIEW: CPT

## 2018-02-02 PROCEDURE — 36415 COLL VENOUS BLD VENIPUNCTURE: CPT

## 2018-02-02 PROCEDURE — 94640 AIRWAY INHALATION TREATMENT: CPT

## 2018-02-02 PROCEDURE — 99285 EMERGENCY DEPT VISIT HI MDM: CPT

## 2018-02-02 PROCEDURE — 96374 THER/PROPH/DIAG INJ IV PUSH: CPT

## 2018-02-02 PROCEDURE — G0378 HOSPITAL OBSERVATION PER HR: HCPCS

## 2018-02-02 PROCEDURE — 93005 ELECTROCARDIOGRAM TRACING: CPT

## 2018-02-02 PROCEDURE — 80053 COMPREHEN METABOLIC PANEL: CPT
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
## 2018-02-02 NOTE — RAD
INDICATION:  Cough and shortness of breath.



COMPARISON:  There are no prior studies available for comparison.



TECHNIQUE: A portable view of the chest was obtained.



FINDINGS: Cardiac and mediastinal contours appear to be within normal limits.



The lungs are underinflated and clear. There is blunting of the left costophrenic angle

possibly indicating a small pleural effusion.



IMPRESSION:  POSSIBLE SMALL LEFT PLEURAL EFFUSION.

## 2018-02-03 VITALS — SYSTOLIC BLOOD PRESSURE: 107 MMHG | DIASTOLIC BLOOD PRESSURE: 87 MMHG

## 2018-02-03 LAB
HCT VFR BLD AUTO: 41 % (ref 35–47)
HGB BLD-MCNC: 13.8 G/DL (ref 12–16)
MCH RBC QN AUTO: 30 PG (ref 27–31)
MCHC RBC AUTO-ENTMCNC: 34 G/DL (ref 31–36)
MCV RBC AUTO: 89 FL (ref 80–97)
PLATELET # BLD AUTO: 165 10^3/UL (ref 150–450)
RBC # BLD AUTO: 4.58 10^6/UL (ref 4–5.4)
WBC # BLD AUTO: 3 10^3/UL (ref 3.5–10.8)

## 2018-02-03 NOTE — DS
CC:  Dr. Morales; Dr. Bruner

 

DISCHARGE SUMMARY:

 

DATE OF ADMISSION:

 

DATE OF DISCHARGE:  02/03/18

 

HISTORY:  This 67-year-old woman presented with cough and shortness of breath.  The history was detai
led in the admission note.  She was felt to have an exacerbation of COPD with acute bronchitis.

 

She was given azithromycin and ceftriaxone.  I believe she had a dose of levofloxacin in the emergenc
y room.  She was given intravenous glucocorticoids as well.

 

She did very well in the hospital.  She was nearly asymptomatic on the day of discharge.  Her lung ex
am was nearly normal, possibly slightly diminished breath sounds but no rales, rhonchi, or wheezing. 
 Her appetite was good.  She seemed pretty much at her baseline.

 

She will finish 7 days of cephalosporin with cefuroxime.  She will finish her 5-day course of azithro
mycin.  She will be on a tapering dose of prednisone.  All other medications will be the same.

 

She was advised to quit smoking and avoid second-hand smoke.

 

FINAL DIAGNOSES:

1.  Chronic obstructive pulmonary disease exacerbation.

2.  Tobacco use disorder.

3.  Seizure disorder.

4.  Dementia.

 

DISCHARGE MEDICATIONS:

1.  Azithromycin 250 mg daily for 4 days.

2.  Cefuroxime 500 mg b.i.d. for 6 days.

3.  Prednisone 10 mg taper from 4-0 over 4 days.

4.  Levothyroxine 75 mcg daily.

5.  Sertraline 50 mg daily.

6.  Acetaminophen 325 mg every 4 hours p.r.n.

7.  Benzonatate 100 mg t.i.d. p.r.n. 8.  Carbamazepine  mg t.i.d.

9.  Levetiracetam 1500 mg h.s. and 1000 mg a.m.

10.  Loperamide 2 mg b.i.d. p.r.n.

11.  Multivitamin with minerals daily.

12.  Lacosamide 100 mg daily.

 

 [id number] #: [id number]

## 2019-08-09 ENCOUNTER — HOSPITAL ENCOUNTER (EMERGENCY)
Dept: HOSPITAL 25 - ED | Age: 69
Discharge: HOME | End: 2019-08-09
Payer: MEDICARE

## 2019-08-09 VITALS — DIASTOLIC BLOOD PRESSURE: 63 MMHG | SYSTOLIC BLOOD PRESSURE: 156 MMHG

## 2019-08-09 DIAGNOSIS — G93.89: ICD-10-CM

## 2019-08-09 DIAGNOSIS — Z88.8: ICD-10-CM

## 2019-08-09 DIAGNOSIS — F17.210: ICD-10-CM

## 2019-08-09 DIAGNOSIS — Z79.899: ICD-10-CM
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
## 2019-08-09 DIAGNOSIS — R22.0: Primary | ICD-10-CM
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
## 2019-08-09 DIAGNOSIS — E07.9: ICD-10-CM

## 2019-08-09 DIAGNOSIS — Z23: ICD-10-CM

## 2019-08-09 DIAGNOSIS — Z88.0: ICD-10-CM

## 2019-08-09 LAB
ALBUMIN SERPL BCG-MCNC: 4 G/DL (ref 3.2–5.2)
ALBUMIN/GLOB SERPL: 1.3 {RATIO} (ref 1–3)
ALP SERPL-CCNC: 203 U/L (ref 34–104)
ALT SERPL W P-5'-P-CCNC: 13 U/L (ref 7–52)
ANION GAP SERPL CALC-SCNC: 9 MMOL/L (ref 2–11)
APTT PPP: 39.4 SECONDS (ref 26–38)
AST SERPL-CCNC: 16 U/L (ref 13–39)
BASOPHILS # BLD AUTO: 0 10^3/UL (ref 0–0.2)
BUN SERPL-MCNC: 7 MG/DL (ref 6–24)
BUN/CREAT SERPL: 14.6 (ref 8–20)
CALCIUM SERPL-MCNC: 8.8 MG/DL (ref 8.6–10.3)
CHLORIDE SERPL-SCNC: 103 MMOL/L (ref 101–111)
CHOLEST SERPL-MCNC: 182 MG/DL
EOSINOPHIL # BLD AUTO: 0.1 10^3/UL (ref 0–0.6)
GLOBULIN SER CALC-MCNC: 3.1 G/DL (ref 2–4)
GLUCOSE SERPL-MCNC: 113 MG/DL (ref 70–100)
HCO3 SERPL-SCNC: 23 MMOL/L (ref 22–32)
HCT VFR BLD AUTO: 41 % (ref 35–47)
HDLC SERPL-MCNC: 51.4 MG/DL
HGB BLD-MCNC: 13.5 G/DL (ref 12–16)
INR PPP/BLD: 1.04 (ref 0.82–1.09)
LYMPHOCYTES # BLD AUTO: 0.7 10^3/UL (ref 1–4.8)
MCH RBC QN AUTO: 29 PG (ref 27–31)
MCHC RBC AUTO-ENTMCNC: 33 G/DL (ref 31–36)
MCV RBC AUTO: 86 FL (ref 80–97)
MONOCYTES # BLD AUTO: 0.4 10^3/UL (ref 0–0.8)
NEUTROPHILS # BLD AUTO: 3.1 10^3/UL (ref 1.5–7.7)
NRBC # BLD AUTO: 0 10^3/UL
NRBC BLD QL AUTO: 0.1
PLATELET # BLD AUTO: 218 10^3/UL (ref 150–450)
POTASSIUM SERPL-SCNC: 3.9 MMOL/L (ref 3.5–5)
PROT SERPL-MCNC: 7.1 G/DL (ref 6.4–8.9)
RBC # BLD AUTO: 4.69 10^6 /UL (ref 3.7–4.87)
SODIUM SERPL-SCNC: 135 MMOL/L (ref 135–145)
TRIGL SERPL-MCNC: 68 MG/DL
TROPONIN I SERPL-MCNC: 0 NG/ML (ref ?–0.04)
WBC # BLD AUTO: 4.4 10^3/UL (ref 3.5–10.8)

## 2019-08-09 PROCEDURE — 86803 HEPATITIS C AB TEST: CPT

## 2019-08-09 PROCEDURE — 99285 EMERGENCY DEPT VISIT HI MDM: CPT

## 2019-08-09 PROCEDURE — 84484 ASSAY OF TROPONIN QUANT: CPT

## 2019-08-09 PROCEDURE — 90471 IMMUNIZATION ADMIN: CPT

## 2019-08-09 PROCEDURE — 71045 X-RAY EXAM CHEST 1 VIEW: CPT

## 2019-08-09 PROCEDURE — 85610 PROTHROMBIN TIME: CPT

## 2019-08-09 PROCEDURE — 83605 ASSAY OF LACTIC ACID: CPT

## 2019-08-09 PROCEDURE — 70450 CT HEAD/BRAIN W/O DYE: CPT

## 2019-08-09 PROCEDURE — 70486 CT MAXILLOFACIAL W/O DYE: CPT

## 2019-08-09 PROCEDURE — 80061 LIPID PANEL: CPT

## 2019-08-09 PROCEDURE — 90715 TDAP VACCINE 7 YRS/> IM: CPT

## 2019-08-09 PROCEDURE — 81003 URINALYSIS AUTO W/O SCOPE: CPT

## 2019-08-09 PROCEDURE — 85025 COMPLETE CBC W/AUTO DIFF WBC: CPT

## 2019-08-09 PROCEDURE — 85730 THROMBOPLASTIN TIME PARTIAL: CPT

## 2019-08-09 PROCEDURE — 93005 ELECTROCARDIOGRAM TRACING: CPT

## 2019-08-09 PROCEDURE — 80053 COMPREHEN METABOLIC PANEL: CPT

## 2019-08-09 PROCEDURE — 95816 EEG AWAKE AND DROWSY: CPT

## 2019-08-09 PROCEDURE — 96360 HYDRATION IV INFUSION INIT: CPT

## 2019-08-09 PROCEDURE — 36415 COLL VENOUS BLD VENIPUNCTURE: CPT

## 2019-08-09 PROCEDURE — 96361 HYDRATE IV INFUSION ADD-ON: CPT
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
## 2019-08-09 NOTE — XMS REPORT
Continuity of Care Document (CCD)

 Created on:2019



Patient:Rosalind Vega

Sex:Female

:1950

External Reference #:[id number]





Demographics







 Address  [address] [place] [address]

 

 Home Phone  [phone]

 

 Mobile Phone  [phone]

 

 Work Phone  [phone]

 

 Preferred Language  en

 

 Marital Status  Not  or 

 

 [employer] Affiliation  Unknown

 

 Race  White

 

 Ethnic Group  Not  or 









Author







 Name  Corey Gregg M.D.

 

 Address  [address]



   Unavailable



   [place] [address]









Support







 Name  Relationship  Address  Phone

 

 Alban Vega  Sibling  Unavailable  [phone]









Care Team Providers







 Name  Role  Phone

 

 Scottie Morales M.D.  Primary Care Physician  Unavailable









Payers







 Date  Identification Numbers  Payment Provider  Subscriber

 

   Policy Number: [id number]  [facility]  Rosalind Vega









 Group Number: [id number]   [id number]

 

 PayID: [id number]  [place] [address]







Problems







 Active Problems  Provider  Date

 

 Epilepsy    Onset: 

 

 Anxiety    Onset: 

 

 Hypothyroidism    Onset: 

 

 Posttraumatic stress disorder    Onset: 

 

 Bilateral age-related nonexudative macular  Corey Gregg M.D.  Onset: 



 degeneration    

 

 Nuclear senile cataract  Corey Gregg M.D.  Onset: 2015

 

 Nonexudative age-related macular degeneration  Corey Gregg M.D.  Onset: 







Family History







 Date  Family Member(s)  Observation  Comments

 

   Father  Cataract  

 

   Mother  No Current Problems  







Social History







 Type  Date  Description  Comments

 

 Birth Sex    Unknown  

 

 Marital Status    Legal Status: Never   

 

 Work Status    Disabled  

 

 ETOH Use    Denies alcohol use  

 

 Recreational Drug Use    Denies Drug Use  

 

 Tobacco Use  Start: Unknown End:  Patient is a former smoker  



       

 

 Smoking Status  Reviewed: 19  Patient is a former smoker  







Allergies, Adverse Reactions, Alerts







 Active Allergies  Reaction  Severity  Comments  Date

 

 Amoxicillin  Urticaria      2015

 

 Ampicillin  Urticaria      2015







Medications







 Active Medications  SIG  Qnty  Indications  Ordering Provider  Date

 

 Preservision Areds 2  take one capsule  60caps    Corey Gregg,  2015



   by mouth twice a      M.D.  



 Areds 2 Capsules  day        



           

 

 Levothyroxine Sodium  every day      Unknown  



           



 25mcg Tablets          



           

 

 Sertraline HCL        Unknown  



             50mg          



 Tablets          



           

 

 Vimpat        Unknown  

 

 Carbamazepine  three times a      Unknown  



            200mg  day        



 Tablets          



           

 

 Acetaminophen  every 4 hours      Unknown  



            325mg          



 Tablets          



           

 

 Levetiracetam  2 pills in the      Unknown  



            500mg  Am - 3 pills in        



 Tablets  the evening        



           









 History Medications









 Keppra        Unknown   - 2016



 1000mg Tablets          

 

 Meloxicam  every day      Unknown   - 2016



 7.5mg Tablets          

 

 Benzonatate  three times a day      Unknown   - 2019



 100mg Capsules          







Procedures







 Date  Code  Description  Status

 

 2018  72778  Scanning Computerized Opthalmic Diagnostic Posterior Seg  
Completed



     Retina  

 

 2018  57723  Est Patient Comprehensive Exam  Completed

 

 2017  93520  Scanning Computerized Opthalmic Diagnostic Posterior Seg  
Completed



     Retina  

 

 2017  87237  Est Patient Comprehensive Exam  Completed

 

 2016  28646  Scanning Computerized Opthalmic Diagnostic Posterior Seg  
Completed



     Retina  

 

 2016  81315  Est Patient Comprehensive Exam  Completed

 

 2016  58529  Determination Of Refractive State  Completed

 

 2015  80389  Scanning Computerized Opthalmic Diagnostic Posterior Seg  
Completed



     Retina  

 

 2015  41053  Est Patient Comprehensive Exam  Completed

 

 2014  38476  Est Patient Comprehensive Exam  Completed

 

 2014  90093  Fundus Photography With Interpretation And Report  Completed

 

 2013  47978  Fundus Photography With Interpretation And Report  Completed

 

 2013  40117  Determination Of Refractive State  Completed

 

 2013  00047  Est Patient Comprehensive Exam  Completed

 

 2011  61668  Determination Of Refractive State  Completed

 

 2011  23440  Est Patient Comprehensive Exam  Completed

 

 2009  83227  Determination Of Refractive State  Completed

 

 2009  28386  Est Patient Comprehensive Exam  Completed

 

 2005  14144  Determination Of Refractive State  Completed

 

 2005  15857  Est Patient Comprehensive Exam  Completed







Plan of Treatment

2019 - Corey Gregg M.D.H35.3132 Nonexudative age-related macular 
degeneration, bilateral, intermediate dry stageComments:Smoking can increase 
the risk of developing or worsening any eye related disease, as well as affect 
your overall health.  If you are a smoker, we strongly recommend that you 
quit.If you are not a smoker, we strongly recommend that you do not start.  You 
have Macular Degeneration. Check your Amsler Grid, with each eye separately, 
and take the AREDS II formula vitamins. If you notice any changes in your vision
, please call the office and schedule an appointment to see any of the doctors 
here.Follow up:1 Year Follow Up OCT MAC  You can expect to have your eyes 
dilated at your next visit. If Dr. Gregg orders any additional testing, it may 
require extra time. We recommend that you bring sunglasses, as dilation drops 
often make you light sensitive until they wear off. We always recommend you 
bring someone to drive you home if you are uncomfortable driving with your eyes 
dilated. If you have any questions before your next visit, feel free to call 
our office at [phone].H25.13 Age-related nuclear cataract, 
bilateralComments:You have been diagnosed with cataracts. If you are happy with 
your vision as it is now, then we willsee you at your next scheduled 
appointment. If you feel like your vision is getting worse before your 
scheduled appointment, please call Mary at [phone].
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
## 2019-10-04 ENCOUNTER — HOSPITAL ENCOUNTER (EMERGENCY)
Dept: HOSPITAL 25 - ED | Age: 69
Discharge: HOME | End: 2019-10-04
Payer: MEDICARE

## 2019-10-04 VITALS — DIASTOLIC BLOOD PRESSURE: 72 MMHG | SYSTOLIC BLOOD PRESSURE: 134 MMHG

## 2019-10-04 VITALS — SYSTOLIC BLOOD PRESSURE: 128 MMHG | DIASTOLIC BLOOD PRESSURE: 58 MMHG

## 2019-10-04 DIAGNOSIS — F17.210: ICD-10-CM

## 2019-10-04 DIAGNOSIS — Z85.3: ICD-10-CM

## 2019-10-04 DIAGNOSIS — R56.9: Primary | ICD-10-CM

## 2019-10-04 DIAGNOSIS — E03.9: ICD-10-CM

## 2019-10-04 DIAGNOSIS — Z79.899: ICD-10-CM

## 2019-10-04 DIAGNOSIS — G40.909: Primary | ICD-10-CM

## 2019-10-04 DIAGNOSIS — Z88.0: ICD-10-CM

## 2019-10-04 LAB
ALBUMIN SERPL BCG-MCNC: 3.8 G/DL (ref 3.2–5.2)
ALBUMIN/GLOB SERPL: 1.4 {RATIO} (ref 1–3)
ALP SERPL-CCNC: 185 U/L (ref 34–104)
ALT SERPL W P-5'-P-CCNC: 22 U/L (ref 7–52)
ANION GAP SERPL CALC-SCNC: 4 MMOL/L (ref 2–11)
AST SERPL-CCNC: 17 U/L (ref 13–39)
BASOPHILS # BLD AUTO: 0 10^3/UL (ref 0–0.2)
BUN SERPL-MCNC: 8 MG/DL (ref 6–24)
BUN/CREAT SERPL: 14.3 (ref 8–20)
CALCIUM SERPL-MCNC: 8.8 MG/DL (ref 8.6–10.3)
CHLORIDE SERPL-SCNC: 103 MMOL/L (ref 101–111)
EOSINOPHIL # BLD AUTO: 0.2 10^3/UL (ref 0–0.6)
GLOBULIN SER CALC-MCNC: 2.8 G/DL (ref 2–4)
GLUCOSE SERPL-MCNC: 106 MG/DL (ref 70–100)
HCO3 SERPL-SCNC: 28 MMOL/L (ref 22–32)
HCT VFR BLD AUTO: 40 % (ref 35–47)
HGB BLD-MCNC: 13.4 G/DL (ref 12–16)
INR PPP/BLD: 1.06 (ref 0.82–1.09)
LYMPHOCYTES # BLD AUTO: 0.9 10^3/UL (ref 1–4.8)
MAGNESIUM SERPL-MCNC: 2 MG/DL (ref 1.9–2.7)
MCH RBC QN AUTO: 29 PG (ref 27–31)
MCHC RBC AUTO-ENTMCNC: 34 G/DL (ref 31–36)
MCV RBC AUTO: 86 FL (ref 80–97)
MONOCYTES # BLD AUTO: 0.5 10^3/UL (ref 0–0.8)
NEUTROPHILS # BLD AUTO: 4 10^3/UL (ref 1.5–7.7)
NRBC # BLD AUTO: 0 10^3/UL
NRBC BLD QL AUTO: 0
PLATELET # BLD AUTO: 238 10^3/UL (ref 150–450)
POTASSIUM SERPL-SCNC: 4.2 MMOL/L (ref 3.5–5)
PROT SERPL-MCNC: 6.6 G/DL (ref 6.4–8.9)
RBC # BLD AUTO: 4.66 10^6 /UL (ref 3.7–4.87)
SODIUM SERPL-SCNC: 135 MMOL/L (ref 135–145)
TSH SERPL-ACNC: 1.13 MCIU/ML (ref 0.34–5.6)
WBC # BLD AUTO: 5.7 10^3/UL (ref 3.5–10.8)

## 2019-10-04 PROCEDURE — 84443 ASSAY THYROID STIM HORMONE: CPT

## 2019-10-04 PROCEDURE — 80299 QUANTITATIVE ASSAY DRUG: CPT

## 2019-10-04 PROCEDURE — 80177 DRUG SCRN QUAN LEVETIRACETAM: CPT

## 2019-10-04 PROCEDURE — 36415 COLL VENOUS BLD VENIPUNCTURE: CPT

## 2019-10-04 PROCEDURE — 99281 EMR DPT VST MAYX REQ PHY/QHP: CPT

## 2019-10-04 PROCEDURE — 96365 THER/PROPH/DIAG IV INF INIT: CPT

## 2019-10-04 PROCEDURE — 85025 COMPLETE CBC W/AUTO DIFF WBC: CPT

## 2019-10-04 PROCEDURE — 99283 EMERGENCY DEPT VISIT LOW MDM: CPT

## 2019-10-04 PROCEDURE — 83605 ASSAY OF LACTIC ACID: CPT

## 2019-10-04 PROCEDURE — 71045 X-RAY EXAM CHEST 1 VIEW: CPT

## 2019-10-04 PROCEDURE — 80307 DRUG TEST PRSMV CHEM ANLYZR: CPT

## 2019-10-04 PROCEDURE — 85610 PROTHROMBIN TIME: CPT

## 2019-10-04 PROCEDURE — 80053 COMPREHEN METABOLIC PANEL: CPT

## 2019-10-04 PROCEDURE — G0480 DRUG TEST DEF 1-7 CLASSES: HCPCS

## 2019-10-04 PROCEDURE — 93005 ELECTROCARDIOGRAM TRACING: CPT

## 2019-10-04 PROCEDURE — 83735 ASSAY OF MAGNESIUM: CPT

## 2019-10-04 PROCEDURE — 80320 DRUG SCREEN QUANTALCOHOLS: CPT

## 2019-10-04 PROCEDURE — 81003 URINALYSIS AUTO W/O SCOPE: CPT
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
## 2020-04-13 ENCOUNTER — HOSPITAL ENCOUNTER (EMERGENCY)
Dept: HOSPITAL 25 - ED | Age: 70
Discharge: HOME | End: 2020-04-13
Payer: MEDICARE

## 2020-04-13 VITALS — SYSTOLIC BLOOD PRESSURE: 122 MMHG | DIASTOLIC BLOOD PRESSURE: 64 MMHG

## 2020-04-13 DIAGNOSIS — E03.9: ICD-10-CM

## 2020-04-13 DIAGNOSIS — G40.909: Primary | ICD-10-CM

## 2020-04-13 DIAGNOSIS — Z87.891: ICD-10-CM

## 2020-04-13 DIAGNOSIS — Z88.0: ICD-10-CM

## 2020-04-13 DIAGNOSIS — G31.84: ICD-10-CM

## 2020-04-13 DIAGNOSIS — Z79.890: ICD-10-CM

## 2020-04-13 DIAGNOSIS — Z79.899: ICD-10-CM

## 2020-04-13 LAB
ALBUMIN SERPL BCG-MCNC: 3.9 G/DL (ref 3.2–5.2)
ALBUMIN/GLOB SERPL: 1.2 {RATIO} (ref 1–3)
ALP SERPL-CCNC: 170 U/L (ref 34–104)
ALT SERPL W P-5'-P-CCNC: 28 U/L (ref 7–52)
ANION GAP SERPL CALC-SCNC: 6 MMOL/L (ref 2–11)
AST SERPL-CCNC: 20 U/L (ref 13–39)
BASOPHILS # BLD AUTO: 0 10^3/UL (ref 0–0.2)
BUN SERPL-MCNC: 10 MG/DL (ref 6–24)
BUN/CREAT SERPL: 15.9 (ref 8–20)
CALCIUM SERPL-MCNC: 9.2 MG/DL (ref 8.6–10.3)
CHLORIDE SERPL-SCNC: 104 MMOL/L (ref 101–111)
EOSINOPHIL # BLD AUTO: 0.2 10^3/UL (ref 0–0.6)
GLOBULIN SER CALC-MCNC: 3.3 G/DL (ref 2–4)
GLUCOSE SERPL-MCNC: 116 MG/DL (ref 70–100)
HCO3 SERPL-SCNC: 27 MMOL/L (ref 22–32)
HCT VFR BLD AUTO: 43 % (ref 35–47)
HGB BLD-MCNC: 14.6 G/DL (ref 12–16)
INR PPP/BLD: 1.11 (ref 0.82–1.09)
LYMPHOCYTES # BLD AUTO: 1.1 10^3/UL (ref 1–4.8)
MAGNESIUM SERPL-MCNC: 1.9 MG/DL (ref 1.9–2.7)
MCH RBC QN AUTO: 28 PG (ref 27–31)
MCHC RBC AUTO-ENTMCNC: 34 G/DL (ref 31–36)
MCV RBC AUTO: 83 FL (ref 80–97)
MONOCYTES # BLD AUTO: 0.5 10^3/UL (ref 0–0.8)
NEUTROPHILS # BLD AUTO: 3.5 10^3/UL (ref 1.5–7.7)
NRBC # BLD AUTO: 0 10^3/UL
NRBC BLD QL AUTO: 0
PLATELET # BLD AUTO: 209 10^3/UL (ref 150–450)
POTASSIUM SERPL-SCNC: 4 MMOL/L (ref 3.5–5)
PROT SERPL-MCNC: 7.2 G/DL (ref 6.4–8.9)
RBC # BLD AUTO: 5.15 10^6 /UL (ref 3.7–4.87)
SODIUM SERPL-SCNC: 137 MMOL/L (ref 135–145)
VIT C UR QL: (no result)
WBC # BLD AUTO: 5.4 10^3/UL (ref 3.5–10.8)

## 2020-04-13 PROCEDURE — 80053 COMPREHEN METABOLIC PANEL: CPT

## 2020-04-13 PROCEDURE — 85025 COMPLETE CBC W/AUTO DIFF WBC: CPT

## 2020-04-13 PROCEDURE — 99284 EMERGENCY DEPT VISIT MOD MDM: CPT

## 2020-04-13 PROCEDURE — 80177 DRUG SCRN QUAN LEVETIRACETAM: CPT

## 2020-04-13 PROCEDURE — 80299 QUANTITATIVE ASSAY DRUG: CPT

## 2020-04-13 PROCEDURE — 93005 ELECTROCARDIOGRAM TRACING: CPT

## 2020-04-13 PROCEDURE — 83605 ASSAY OF LACTIC ACID: CPT

## 2020-04-13 PROCEDURE — 81003 URINALYSIS AUTO W/O SCOPE: CPT

## 2020-04-13 PROCEDURE — 85610 PROTHROMBIN TIME: CPT

## 2020-04-13 PROCEDURE — 36415 COLL VENOUS BLD VENIPUNCTURE: CPT

## 2020-04-13 PROCEDURE — 84100 ASSAY OF PHOSPHORUS: CPT

## 2020-04-13 PROCEDURE — 83735 ASSAY OF MAGNESIUM: CPT
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
